# Patient Record
Sex: FEMALE | Race: OTHER | HISPANIC OR LATINO | URBAN - METROPOLITAN AREA
[De-identification: names, ages, dates, MRNs, and addresses within clinical notes are randomized per-mention and may not be internally consistent; named-entity substitution may affect disease eponyms.]

---

## 2023-03-21 ENCOUNTER — OFFICE VISIT (OUTPATIENT)
Dept: OBGYN CLINIC | Facility: CLINIC | Age: 53
End: 2023-03-21

## 2023-03-21 VITALS
HEART RATE: 93 BPM | DIASTOLIC BLOOD PRESSURE: 88 MMHG | SYSTOLIC BLOOD PRESSURE: 137 MMHG | HEIGHT: 65 IN | RESPIRATION RATE: 18 BRPM | BODY MASS INDEX: 29.99 KG/M2 | WEIGHT: 180 LBS

## 2023-03-21 DIAGNOSIS — Z78.9 LANGUAGE BARRIER: ICD-10-CM

## 2023-03-21 DIAGNOSIS — R10.33 PERIUMBILICAL ABDOMINAL PAIN: Primary | ICD-10-CM

## 2023-03-21 DIAGNOSIS — Z59.9 FINANCIAL DIFFICULTIES: ICD-10-CM

## 2023-03-21 DIAGNOSIS — Z00.00 HEALTHCARE MAINTENANCE: ICD-10-CM

## 2023-03-21 PROBLEM — Z75.8 LANGUAGE BARRIER: Status: ACTIVE | Noted: 2023-03-21

## 2023-03-21 PROBLEM — Z60.3 LANGUAGE BARRIER: Status: ACTIVE | Noted: 2023-03-21

## 2023-03-21 SDOH — ECONOMIC STABILITY - INCOME SECURITY: PROBLEM RELATED TO HOUSING AND ECONOMIC CIRCUMSTANCES, UNSPECIFIED: Z59.9

## 2023-03-21 NOTE — PROGRESS NOTES
Assessment/Plan:    No problem-specific Assessment & Plan notes found for this encounter  Diagnoses and all orders for this visit:    Periumbilical abdominal pain/ mid abdomen  -     Chlamydia/GC amplified DNA by PCR  Referral to [facility], and to also establish care for DM and HTN  Financial difficulties  -     Ambulatory referral to social work care management program; Future    Language barrier    RTO for annual GYN exam, pap, MMG        Subjective:      Patient ID: Lea Agustin is a 47 y o  female  HPI 46 yo   new pt here with pelvic pain x 3 months  She is accompanied by her daughter-in-law   [id number] used  Patient came  4 months ago from [facility]  Does not get her pain everyday , gets out 3 times a week  Last a few minutes up to 1 hour  Alleviating factors - ibuprofen helps  Aggravating factors-pain is increased with activity, more so when very active  Does not get pain at night, able to sleep, only gets during the day  Sexually active- no, not for 10 years, consents to culture for gc and ct today  She denies diarrhea, has constipation, has BM'S every 1-2 days is her normal  She denies pain with food intake, denies heartburn  She reports no diet change  She denies pain today  She entered  menopause at age age 47, denies any postmenopausal bleeding  Last pap- 1 year ago, denies abnormal Pap history  She  does have history of diabetes and hypertension, currently not on any medication due to access  She needs to establish a PCP  Will have  patient meet with  today  The following portions of the patient's history were reviewed and updated as appropriate: allergies, current medications, past family history, past medical history, past social history, past surgical history and problem list     Review of Systems   Constitutional: Negative for chills and fever  Eyes: Negative for photophobia and visual disturbance     Respiratory: Negative  Cardiovascular: Negative  Gastrointestinal: Positive for abdominal pain and constipation  Negative for diarrhea, nausea and vomiting  Genitourinary: Negative for dysuria, urgency and vaginal discharge  Neurological: Negative for dizziness and headaches  Objective:      /88 (BP Location: Right arm, Patient Position: Standing, Cuff Size: Adult)   Pulse 93   Resp 18   Ht 5' 5" (1 651 m)   Wt 81 6 kg (180 lb)   BMI 29 95 kg/m²          Physical Exam  Constitutional:       Appearance: Normal appearance  Cardiovascular:      Rate and Rhythm: Normal rate and regular rhythm  Pulmonary:      Effort: Pulmonary effort is normal       Breath sounds: Normal breath sounds  Abdominal:      Palpations: Abdomen is soft  There is no mass  Tenderness: There is no abdominal tenderness  There is no guarding  Skin:     General: Skin is warm and dry  Neurological:      Mental Status: She is oriented to person, place, and time     Psychiatric:         Mood and Affect: Mood normal          Behavior: Behavior normal        External genitalia-no lesions or erythema  Vagina-normal white discharge  Cervix-negative CMT no lesions  Uterus-anteverted, nontender  Adnexa-NP, nontender

## 2023-03-22 ENCOUNTER — PATIENT OUTREACH (OUTPATIENT)
Dept: OBGYN CLINIC | Facility: CLINIC | Age: 53
End: 2023-03-22

## 2023-03-22 LAB
C TRACH DNA SPEC QL NAA+PROBE: NEGATIVE
N GONORRHOEA DNA SPEC QL NAA+PROBE: NEGATIVE

## 2023-03-22 NOTE — PROGRESS NOTES
LUCRETIA BARAHONA spoke with 46 y/o- S- P3- Thai Speaking woman for assessment  Pt recently came from [facility] ans resides with friends  Pt's reported her son was detained by immigration and she is waiting for his release  Pt is not employed and states her friends are supporting her  Pt is participating of the local food bank program      Pt claimed her friends and brothers from [facility] are very supportive  Pt main concenr is having her son with her  Pt denies other needs  LUCRETIA BARAHONA explained her role and gave contact information  Pt was encouraged to call at any time needed

## 2023-03-22 NOTE — RESULT ENCOUNTER NOTE
Culture for HOSP [facility] and CT Result is negative, please call patient to inform     Thanks

## 2023-03-24 ENCOUNTER — TELEPHONE (OUTPATIENT)
Dept: OBGYN CLINIC | Facility: CLINIC | Age: 53
End: 2023-03-24

## 2023-03-24 NOTE — TELEPHONE ENCOUNTER
ID [id number]    Attempted to call and left message with test results, ok per communication consent form  Office number provided in message

## 2023-03-24 NOTE — TELEPHONE ENCOUNTER
----- Message from Flaquita Parrish sent at 3/22/2023  1:48 PM EDT -----    Culture for HOSP [facility] and CT Result is negative, please call patient to inform     Thanks

## 2023-04-03 ENCOUNTER — ANNUAL EXAM (OUTPATIENT)
Dept: OBGYN CLINIC | Facility: CLINIC | Age: 53
End: 2023-04-03

## 2023-04-03 VITALS
WEIGHT: 176 LBS | RESPIRATION RATE: 18 BRPM | SYSTOLIC BLOOD PRESSURE: 108 MMHG | HEART RATE: 78 BPM | DIASTOLIC BLOOD PRESSURE: 75 MMHG | BODY MASS INDEX: 29.32 KG/M2 | HEIGHT: 65 IN

## 2023-04-03 DIAGNOSIS — Z12.4 CERVICAL CANCER SCREENING: ICD-10-CM

## 2023-04-03 DIAGNOSIS — Z01.419 WOMEN'S ANNUAL ROUTINE GYNECOLOGICAL EXAMINATION: Primary | ICD-10-CM

## 2023-04-03 DIAGNOSIS — Z12.31 ENCOUNTER FOR SCREENING MAMMOGRAM FOR MALIGNANT NEOPLASM OF BREAST: ICD-10-CM

## 2023-04-03 DIAGNOSIS — Z12.11 COLON CANCER SCREENING: ICD-10-CM

## 2023-04-03 DIAGNOSIS — Z01.419 PAP SMEAR, AS PART OF ROUTINE GYNECOLOGICAL EXAMINATION: ICD-10-CM

## 2023-04-03 NOTE — PROGRESS NOTES
"ANNUAL GYNECOLOGICAL EXAMINATION    Temi Garcia is a 47 y o  female who presents today for annual GYN exam   Her last pap smear was performed about one year ago and result was normal per patient  She reports no history of abnormal pap smears in her past   Her last mammogram was performed one year ago and result was normal   She has not had a colon cancer screening performed  She reports menopause around age 47  No LMP recorded  Patient is postmenopausal   She moved to [facility] in 2022  Her general medical history has been reviewed and she reports it as follows:    Past Medical History:   Diagnosis Date   • Diabetes mellitus ([address] )    • Hypertension      History reviewed  No pertinent surgical history  OB History        3    Para   3    Term   3            AB        Living   3       SAB        IAB        Ectopic        Multiple        Live Births   3               Social History     Tobacco Use   • Smoking status: Never   • Smokeless tobacco: Never   Vaping Use   • Vaping Use: Never used   Substance Use Topics   • Alcohol use: Never   • Drug use: Never     Social History     Substance and Sexual Activity   Sexual Activity Not Currently     Cancer-related family history includes Stomach cancer in her mother  There is no history of Breast cancer, Colon cancer, or Ovarian cancer  No current outpatient medications    Review of Systems:  Review of Systems   Constitutional: Negative  Gastrointestinal: Negative  Genitourinary: Negative  Skin: Negative  Physical Exam:  /75 (BP Location: Right arm, Patient Position: Sitting, Cuff Size: Adult)   Pulse 78   Resp 18   Ht 5' 5 35\" (1 66 m)   Wt 79 8 kg (176 lb)   BMI 28 97 kg/m²   Physical Exam  Constitutional:       General: She is not in acute distress  Appearance: Normal appearance  Genitourinary:      Vulva and bladder normal       No lesions in the vagina  No vaginal erythema or ulceration   " Right Adnexa: not tender and no mass present  Left Adnexa: not tender and no mass present  No cervical motion tenderness or lesion  Uterus is not enlarged or tender  No uterine mass detected  Breasts:     Right: No mass, nipple discharge or skin change  Left: No mass, nipple discharge or skin change  Cardiovascular:      Rate and Rhythm: Normal rate and regular rhythm  Pulmonary:      Effort: Pulmonary effort is normal       Breath sounds: Normal breath sounds  Abdominal:      General: Abdomen is flat  Palpations: Abdomen is soft  Musculoskeletal:      Cervical back: Neck supple  Neurological:      Mental Status: She is alert  Skin:     General: Skin is warm and dry  Psychiatric:         Mood and Affect: Mood normal          Behavior: Behavior normal    Vitals reviewed  Assessment/Plan:   1  Normal well-woman GYN exam   2  Cervical cancer screening:  Normal cervical exam   Pap smear done with HPV co-testing  3  STD screening:  Patient declines  4  Breast cancer screening:  Normal breast exam   Order placed for bilateral screening mammogram   Reviewed breast self-awareness  5  Colon cancer screening: Order placed for consultation with Gastroenterology for consultation to discuss screening  6  Depression Screening: Patient's depression screening was assessed with a PHQ-2 score of 6  Their PHQ-9 score was 13  Patient assessed for underlying major depression  They have no active suicidal ideations  Brief counseling provided and recommend additional follow-up/re-evaluation next office visit  Referral placed for  consultation  7  BMI Counseling: Body mass index is 28 97 kg/m²  Discussed the patient's BMI with her  The BMI is under 30 and does not require intervention  8  Call placed to [phone] to assist patient in scheduling an appointment for management of diabetes      9  Return to office in one year for annual exam or sooner if needed  Reviewed with patient that test results are available in MyChart immediately, but that they will not necessarily be reviewed by me immediately  Explained that I will review results at my earliest opportunity and contact patient appropriately

## 2023-04-05 LAB
HPV HR 12 DNA CVX QL NAA+PROBE: NEGATIVE
HPV16 DNA CVX QL NAA+PROBE: NEGATIVE
HPV18 DNA CVX QL NAA+PROBE: NEGATIVE

## 2023-08-17 ENCOUNTER — TELEPHONE (OUTPATIENT)
Dept: OTHER | Facility: OTHER | Age: 53
End: 2023-08-17

## 2023-08-17 NOTE — TELEPHONE ENCOUNTER
Markel Mcdowell  [unfilled]  [unfilled]  [phone]  No e-mail address on record  Arabic  Arabic  No primary care provider on file.      Mammogram Screening [facility] locations only): 12/21/23 Elwyn Dance under [employer]  Pap smear screening (Clinic vs [facility]):  PCP (Clinic vs [facility]):       Transportation:     Education:

## 2023-08-22 ENCOUNTER — TELEPHONE (OUTPATIENT)
Dept: FAMILY MEDICINE CLINIC | Facility: CLINIC | Age: 53
End: 2023-08-22

## 2023-08-24 ENCOUNTER — TELEPHONE (OUTPATIENT)
Dept: FAMILY MEDICINE CLINIC | Facility: CLINIC | Age: 53
End: 2023-08-24

## 2024-04-08 ENCOUNTER — TELEPHONE (OUTPATIENT)
Dept: OBGYN CLINIC | Facility: CLINIC | Age: 54
End: 2024-04-08

## 2024-04-08 NOTE — TELEPHONE ENCOUNTER
Attempted to call, could not leave a message due to mailbox not being set up. Patient does not have my chart. When patient calls back, please encourage patient to schedule her imaging orders.

## 2025-04-22 ENCOUNTER — TELEPHONE (OUTPATIENT)
Dept: OTHER | Facility: OTHER | Age: 55
End: 2025-04-22

## 2025-04-22 NOTE — TELEPHONE ENCOUNTER
Outreach Reason: [facility] Screening Event:    Outreach made to patient regarding Mammogram screening event May 3rd at [facility] Radiology department from 8-2pm. Information provided and patient encouraged to call [facility] office to determine eligibility for free Breast Cancer Screening.    Contact information: Phone:[phone] to qualify them for our [facility] program for a free Mammogram.      Hope to see you at the event.